# Patient Record
Sex: MALE | Race: WHITE | URBAN - METROPOLITAN AREA
[De-identification: names, ages, dates, MRNs, and addresses within clinical notes are randomized per-mention and may not be internally consistent; named-entity substitution may affect disease eponyms.]

---

## 2020-05-02 ENCOUNTER — OFFICE VISIT (OUTPATIENT)
Dept: URGENT CARE | Facility: URGENT CARE | Age: 60
End: 2020-05-02
Payer: COMMERCIAL

## 2020-05-02 ENCOUNTER — ANCILLARY PROCEDURE (OUTPATIENT)
Dept: GENERAL RADIOLOGY | Facility: CLINIC | Age: 60
End: 2020-05-02
Attending: FAMILY MEDICINE
Payer: COMMERCIAL

## 2020-05-02 VITALS
WEIGHT: 250 LBS | DIASTOLIC BLOOD PRESSURE: 80 MMHG | SYSTOLIC BLOOD PRESSURE: 120 MMHG | TEMPERATURE: 99 F | HEART RATE: 80 BPM

## 2020-05-02 DIAGNOSIS — M79.671 RIGHT FOOT PAIN: Primary | ICD-10-CM

## 2020-05-02 PROCEDURE — 73630 X-RAY EXAM OF FOOT: CPT | Mod: RT

## 2020-05-02 PROCEDURE — 99203 OFFICE O/P NEW LOW 30 MIN: CPT | Performed by: FAMILY MEDICINE

## 2020-05-02 NOTE — PROGRESS NOTES
Chief complaint: foot pain    New patient    3 weeks ago was cutting up some wood  May have dropped something on it  Wasn't really too bad  But since then has had progressively worsening pain      Allergies   Allergen Reactions     Hydrocodone Nausea and Vomiting       No past medical history on file.    No current outpatient medications on file prior to visit.  No current facility-administered medications on file prior to visit.       Social History     Tobacco Use     Smoking status: Never Smoker     Smokeless tobacco: Never Used   Substance Use Topics     Alcohol use: None     Drug use: None       ROS:   review of systems negative except for noted above.     OBJECTIVE:  /80   Pulse 80   Temp 99  F (37.2  C)   Wt 113.4 kg (250 lb)    General:   awake, alert, and cooperative.  NAD.   Head: Normocephalic, atraumatic.  Eyes: Conjunctiva clear,  Neuro: Alert and oriented - normal speech.  MS: Using extremities freely  Affected extremity neurovascularly intact, no cyanosis or edema, good pulses and capillary refill.   Ankle and foot Exam (right):  Inspection:no swelling seen  Palpation:tender over 5th metatarsal no tenderness on navicular bone . No proximal fibular tenderness. No calf tenderness. Achilles tendon is intact  Cap refill intact.    Good doralis pedis.  Neurovascularly Intact Distally.   PSYCH:  Normal affect, normal speech  SKIN: no obvious rashes    xrays to my review no acute fracture or dislocation    ASSESSMENT:    ICD-10-CM    1. Right foot pain  M79.671 XR Foot Right G/E 3 Views     Orthopedic & Spine  Referral       PLAN:   Cannot rule out stress fracture  Recommend weight bearing as tolerated and short gray boot  Referred to orthopedics for follow up    PATIENT noted to have stack finger splint on his left finger - he declined evaluation of this today- he said he injured this 6 weeks ago and seems to be improving - he had a side-consult with an orthopedic assistant then. PATIENT  declined evaluation of this from me today- however he states he will bring it up when he follows up with orthopedics. I advised him to keep the splint on at all times and if he needs to remove it for cleaning - he needs to make sure the finger joint remains extended. Offered xrays today he declined.       Advised about symptoms which might herald more serious problems.        Landy Luis MD

## 2020-05-05 ENCOUNTER — TELEPHONE (OUTPATIENT)
Dept: FAMILY MEDICINE | Facility: CLINIC | Age: 60
End: 2020-05-05

## 2020-05-05 NOTE — TELEPHONE ENCOUNTER
"Patient states that he wears a size 11- 11.5 shoe.  The girl came in and handed him a boot that says \"l\"  He states it's a short boot that comes just below his calf.  He says that his toes are sticking out the end of the boot and his toes drag on the ground when he walks.  He states he's adjusted it 3 times and back of foot is all the way back.  He states was showed how the straps work and how to use the pump.  Was then handed the package.  The boot was never put on to make sure it was the correct size.  He feels needs a larger size.  Injury was right foot across the bridge of foot just below where meets leg.  He does not have the plastic bag that it came in so can't tell me product number.  Lives near [facility] so would like to  larger one there if possible.  Mindy Rivers RN    "

## 2020-05-05 NOTE — TELEPHONE ENCOUNTER
Patient was in urgent care on Saturday and given a boot and he is wondering if his toe is supposed to stick out of it and hit the ground.  Wonders if he got the right size.  Please call.    Thank you.

## 2020-05-05 NOTE — TELEPHONE ENCOUNTER
I spoke with Tatiana Dyer who works at [facility] in [place].  She states that a size large is the correct size for a man with an 11-11.5 shoe size.  She states to have him remove the boot.  Take all the air out.  Put his foot in boot and make sure heel is all the way to the back of the boot.  Put the straps on and then put air back in.  She states that his toes should not be hanging over the front of the boot at all.  If he does this and toes still over the front of boot it may be that it's not really a large size boot.  He would need to call the number on bottom of the home medical equipment purchase agreement form ([phone]) to return/replace the boot.  She states can't be done in clinic and that if it was not sized correctly they will need to replace it even though he's already used it.  Mindy Rivers RN  I called and spoke with patient.  Gave him the instructions as per above.  He states has already done this but will do again.  He states there is no air in the boot.  He has placed his heel all the way to the back and toes still stick over the edge of the gray.  He stood up and tried to position again and still stick over the edge of boot.  I advised him to call the number on his purchase agreement form (he says he does have it)  Per [facility] he will need to contact them to have it replaced if sized wrong.  He states at this time he is going to see if he can work with it as is.  He is going to call and set up his ortho appointment.  If continues to have issues with toes hitting ground when he walks he will call medical supply number.  Mindy Rivers RN